# Patient Record
Sex: MALE | Race: ASIAN | NOT HISPANIC OR LATINO
[De-identification: names, ages, dates, MRNs, and addresses within clinical notes are randomized per-mention and may not be internally consistent; named-entity substitution may affect disease eponyms.]

---

## 2017-01-19 PROBLEM — Z00.00 ENCOUNTER FOR PREVENTIVE HEALTH EXAMINATION: Status: ACTIVE | Noted: 2017-01-19

## 2017-01-26 ENCOUNTER — APPOINTMENT (OUTPATIENT)
Dept: HEART AND VASCULAR | Facility: CLINIC | Age: 57
End: 2017-01-26

## 2017-01-26 VITALS
BODY MASS INDEX: 26.46 KG/M2 | DIASTOLIC BLOOD PRESSURE: 74 MMHG | HEIGHT: 71 IN | SYSTOLIC BLOOD PRESSURE: 110 MMHG | OXYGEN SATURATION: 97 % | HEART RATE: 67 BPM | WEIGHT: 189 LBS

## 2017-01-26 DIAGNOSIS — I25.10 ATHEROSCLEROTIC HEART DISEASE OF NATIVE CORONARY ARTERY W/OUT ANGINA PECTORIS: ICD-10-CM

## 2017-01-26 RX ORDER — LISINOPRIL 5 MG/1
5 TABLET ORAL
Refills: 0 | Status: ACTIVE | COMMUNITY

## 2017-01-26 RX ORDER — ISOSORBIDE MONONITRATE 30 MG
30 TABLET, EXTENDED RELEASE 24 HR ORAL
Refills: 0 | Status: ACTIVE | COMMUNITY

## 2017-01-26 RX ORDER — ATORVASTATIN CALCIUM 40 MG/1
40 TABLET, FILM COATED ORAL DAILY
Qty: 30 | Refills: 11 | Status: ACTIVE | COMMUNITY
Start: 2017-01-26 | End: 1900-01-01

## 2017-01-26 RX ORDER — PANTOPRAZOLE 40 MG/1
40 TABLET, DELAYED RELEASE ORAL
Refills: 0 | Status: ACTIVE | COMMUNITY

## 2017-01-26 RX ORDER — PRASUGREL HYDROCHLORIDE 10 MG/1
10 TABLET, COATED ORAL DAILY
Qty: 30 | Refills: 11 | Status: ACTIVE | COMMUNITY
Start: 2017-01-26 | End: 1900-01-01

## 2017-01-26 RX ORDER — METOPROLOL SUCCINATE 25 MG/1
25 TABLET, EXTENDED RELEASE ORAL
Refills: 0 | Status: ACTIVE | COMMUNITY

## 2017-05-04 ENCOUNTER — APPOINTMENT (OUTPATIENT)
Dept: HEART AND VASCULAR | Facility: CLINIC | Age: 57
End: 2017-05-04

## 2018-01-30 VITALS
TEMPERATURE: 99 F | DIASTOLIC BLOOD PRESSURE: 74 MMHG | RESPIRATION RATE: 18 BRPM | HEART RATE: 92 BPM | SYSTOLIC BLOOD PRESSURE: 113 MMHG | WEIGHT: 179.02 LBS | OXYGEN SATURATION: 96 %

## 2018-01-30 PROCEDURE — 99285 EMERGENCY DEPT VISIT HI MDM: CPT | Mod: 25

## 2018-01-30 RX ORDER — ATORVASTATIN CALCIUM 80 MG/1
1 TABLET, FILM COATED ORAL
Qty: 0 | Refills: 0 | COMMUNITY

## 2018-01-30 RX ORDER — PRASUGREL 5 MG/1
1 TABLET, FILM COATED ORAL
Qty: 0 | Refills: 0 | COMMUNITY

## 2018-01-30 NOTE — ED ADULT NURSE NOTE - PMH
Coronary artery disease    Diabetes    HLD (hyperlipidemia)    Hypertension    Urethral stricture  difficulty urinating

## 2018-01-30 NOTE — ED PROVIDER NOTE - GENITOURINARY, MLM
small small 2 cm area swelling, firm, painful to touch R perineum. no overlying erythema or warmth. No fluctuance

## 2018-01-30 NOTE — ED PROVIDER NOTE - OBJECTIVE STATEMENT
Pt w/ PMHx CAD s/p PCI, DM, HTN, HLD, urethral stricture s/p repair, p/w recurrent abscess to R perineum x 6 days. The abscess is painful, non draining. No f/c. No uncontrolled BS. Pt called Urologist Dr Steward who advised him to come to the ED for admission to Urology and OR I&D in the morning. Pt has no other complaints.

## 2018-01-30 NOTE — ED PROVIDER NOTE - PROGRESS NOTE DETAILS
Urology paged D/w Urology PA - admit to Dr Steward, regional bed. For OR in the am. Requesting Vivian / Zosyn.

## 2018-01-30 NOTE — ED PROVIDER NOTE - MEDICAL DECISION MAKING DETAILS
Pt sent in for admission to Urology for OR I&D for possible abscess. D/w Urology, requesting pre-op labs, EKG, abx, admit to Urology

## 2018-01-30 NOTE — ED ADULT NURSE NOTE - CHPI ED SYMPTOMS NEG
no purulent drainage/no chills/no bleeding/no bleeding at site/no red streaks/no vomiting/no fever/no drainage

## 2018-01-30 NOTE — ED ADULT NURSE NOTE - OBJECTIVE STATEMENT
Pt came to ED complaining of abscess in perineal area x5 days. Pt sent in by PCP for possible surgery tomorrow. Pt reports severe pain.  Pt has approx 1.5cm diameter abscess. No discharge or bleeding found at this time.  Pt denies all other medical complaints at this time. Alert and oriented x3.

## 2018-01-31 ENCOUNTER — INPATIENT (INPATIENT)
Facility: HOSPITAL | Age: 58
LOS: 1 days | Discharge: HOME | DRG: 697 | End: 2018-02-02
Attending: UROLOGY | Admitting: UROLOGY
Payer: COMMERCIAL

## 2018-01-31 DIAGNOSIS — L02.215 CUTANEOUS ABSCESS OF PERINEUM: ICD-10-CM

## 2018-01-31 DIAGNOSIS — Z41.9 ENCOUNTER FOR PROCEDURE FOR PURPOSES OTHER THAN REMEDYING HEALTH STATE, UNSPECIFIED: Chronic | ICD-10-CM

## 2018-01-31 DIAGNOSIS — L02.91 CUTANEOUS ABSCESS, UNSPECIFIED: ICD-10-CM

## 2018-01-31 LAB
ALBUMIN SERPL ELPH-MCNC: 3.4 G/DL — SIGNIFICANT CHANGE UP (ref 3.3–5)
ALP SERPL-CCNC: 97 U/L — SIGNIFICANT CHANGE UP (ref 40–120)
ALT FLD-CCNC: 10 U/L — SIGNIFICANT CHANGE UP (ref 10–45)
ANION GAP SERPL CALC-SCNC: 11 MMOL/L — SIGNIFICANT CHANGE UP (ref 5–17)
ANION GAP SERPL CALC-SCNC: 11 MMOL/L — SIGNIFICANT CHANGE UP (ref 5–17)
APTT BLD: 30.3 SEC — SIGNIFICANT CHANGE UP (ref 27.5–37.4)
AST SERPL-CCNC: 12 U/L — SIGNIFICANT CHANGE UP (ref 10–40)
BASOPHILS NFR BLD AUTO: 0.2 % — SIGNIFICANT CHANGE UP (ref 0–2)
BASOPHILS NFR BLD AUTO: 0.2 % — SIGNIFICANT CHANGE UP (ref 0–2)
BILIRUB SERPL-MCNC: 0.5 MG/DL — SIGNIFICANT CHANGE UP (ref 0.2–1.2)
BLD GP AB SCN SERPL QL: NEGATIVE — SIGNIFICANT CHANGE UP
BUN SERPL-MCNC: 12 MG/DL — SIGNIFICANT CHANGE UP (ref 7–23)
BUN SERPL-MCNC: 13 MG/DL — SIGNIFICANT CHANGE UP (ref 7–23)
CALCIUM SERPL-MCNC: 8.8 MG/DL — SIGNIFICANT CHANGE UP (ref 8.4–10.5)
CALCIUM SERPL-MCNC: 8.9 MG/DL — SIGNIFICANT CHANGE UP (ref 8.4–10.5)
CHLORIDE SERPL-SCNC: 93 MMOL/L — LOW (ref 96–108)
CHLORIDE SERPL-SCNC: 94 MMOL/L — LOW (ref 96–108)
CO2 SERPL-SCNC: 27 MMOL/L — SIGNIFICANT CHANGE UP (ref 22–31)
CO2 SERPL-SCNC: 27 MMOL/L — SIGNIFICANT CHANGE UP (ref 22–31)
CREAT SERPL-MCNC: 0.82 MG/DL — SIGNIFICANT CHANGE UP (ref 0.5–1.3)
CREAT SERPL-MCNC: 0.84 MG/DL — SIGNIFICANT CHANGE UP (ref 0.5–1.3)
EOSINOPHIL NFR BLD AUTO: 1.7 % — SIGNIFICANT CHANGE UP (ref 0–6)
EOSINOPHIL NFR BLD AUTO: 1.8 % — SIGNIFICANT CHANGE UP (ref 0–6)
GLUCOSE BLDC GLUCOMTR-MCNC: 173 MG/DL — HIGH (ref 70–99)
GLUCOSE BLDC GLUCOMTR-MCNC: 233 MG/DL — HIGH (ref 70–99)
GLUCOSE BLDC GLUCOMTR-MCNC: 236 MG/DL — HIGH (ref 70–99)
GLUCOSE BLDC GLUCOMTR-MCNC: 278 MG/DL — HIGH (ref 70–99)
GLUCOSE SERPL-MCNC: 279 MG/DL — HIGH (ref 70–99)
GLUCOSE SERPL-MCNC: 386 MG/DL — HIGH (ref 70–99)
GRAM STN FLD: SIGNIFICANT CHANGE UP
GRAM STN FLD: SIGNIFICANT CHANGE UP
HBA1C BLD-MCNC: 11.1 % — HIGH (ref 4–5.6)
HCT VFR BLD CALC: 38 % — LOW (ref 39–50)
HCT VFR BLD CALC: 38.8 % — LOW (ref 39–50)
HGB BLD-MCNC: 12.7 G/DL — LOW (ref 13–17)
HGB BLD-MCNC: 13 G/DL — SIGNIFICANT CHANGE UP (ref 13–17)
INR BLD: 1.31 — HIGH (ref 0.88–1.16)
LYMPHOCYTES # BLD AUTO: 17.9 % — SIGNIFICANT CHANGE UP (ref 13–44)
LYMPHOCYTES # BLD AUTO: 22 % — SIGNIFICANT CHANGE UP (ref 13–44)
MAGNESIUM SERPL-MCNC: 1.9 MG/DL — SIGNIFICANT CHANGE UP (ref 1.6–2.6)
MCHC RBC-ENTMCNC: 25.1 PG — LOW (ref 27–34)
MCHC RBC-ENTMCNC: 25.6 PG — LOW (ref 27–34)
MCHC RBC-ENTMCNC: 33.4 G/DL — SIGNIFICANT CHANGE UP (ref 32–36)
MCHC RBC-ENTMCNC: 33.5 G/DL — SIGNIFICANT CHANGE UP (ref 32–36)
MCV RBC AUTO: 75.2 FL — LOW (ref 80–100)
MCV RBC AUTO: 76.5 FL — LOW (ref 80–100)
MONOCYTES NFR BLD AUTO: 6.2 % — SIGNIFICANT CHANGE UP (ref 2–14)
MONOCYTES NFR BLD AUTO: 7.5 % — SIGNIFICANT CHANGE UP (ref 2–14)
NEUTROPHILS NFR BLD AUTO: 69.9 % — SIGNIFICANT CHANGE UP (ref 43–77)
NEUTROPHILS NFR BLD AUTO: 72.6 % — SIGNIFICANT CHANGE UP (ref 43–77)
PHOSPHATE SERPL-MCNC: 3 MG/DL — SIGNIFICANT CHANGE UP (ref 2.5–4.5)
PLATELET # BLD AUTO: 262 K/UL — SIGNIFICANT CHANGE UP (ref 150–400)
PLATELET # BLD AUTO: 291 K/UL — SIGNIFICANT CHANGE UP (ref 150–400)
POTASSIUM SERPL-MCNC: 4 MMOL/L — SIGNIFICANT CHANGE UP (ref 3.5–5.3)
POTASSIUM SERPL-MCNC: 4 MMOL/L — SIGNIFICANT CHANGE UP (ref 3.5–5.3)
POTASSIUM SERPL-SCNC: 4 MMOL/L — SIGNIFICANT CHANGE UP (ref 3.5–5.3)
POTASSIUM SERPL-SCNC: 4 MMOL/L — SIGNIFICANT CHANGE UP (ref 3.5–5.3)
PROT SERPL-MCNC: 8.2 G/DL — SIGNIFICANT CHANGE UP (ref 6–8.3)
PROTHROM AB SERPL-ACNC: 14.6 SEC — HIGH (ref 9.8–12.7)
RBC # BLD: 5.05 M/UL — SIGNIFICANT CHANGE UP (ref 4.2–5.8)
RBC # BLD: 5.07 M/UL — SIGNIFICANT CHANGE UP (ref 4.2–5.8)
RBC # FLD: 12.9 % — SIGNIFICANT CHANGE UP (ref 10.3–16.9)
RBC # FLD: 12.9 % — SIGNIFICANT CHANGE UP (ref 10.3–16.9)
RH IG SCN BLD-IMP: NEGATIVE — SIGNIFICANT CHANGE UP
SODIUM SERPL-SCNC: 131 MMOL/L — LOW (ref 135–145)
SODIUM SERPL-SCNC: 132 MMOL/L — LOW (ref 135–145)
SPECIMEN SOURCE: SIGNIFICANT CHANGE UP
SPECIMEN SOURCE: SIGNIFICANT CHANGE UP
WBC # BLD: 10.4 K/UL — SIGNIFICANT CHANGE UP (ref 3.8–10.5)
WBC # BLD: 12.5 K/UL — HIGH (ref 3.8–10.5)
WBC # FLD AUTO: 10.4 K/UL — SIGNIFICANT CHANGE UP (ref 3.8–10.5)
WBC # FLD AUTO: 12.5 K/UL — HIGH (ref 3.8–10.5)

## 2018-01-31 PROCEDURE — 71045 X-RAY EXAM CHEST 1 VIEW: CPT | Mod: 26

## 2018-01-31 PROCEDURE — 99232 SBSQ HOSP IP/OBS MODERATE 35: CPT

## 2018-01-31 PROCEDURE — 93306 TTE W/DOPPLER COMPLETE: CPT | Mod: 26

## 2018-01-31 RX ORDER — SODIUM CHLORIDE 9 MG/ML
1000 INJECTION, SOLUTION INTRAVENOUS
Qty: 0 | Refills: 0 | Status: DISCONTINUED | OUTPATIENT
Start: 2018-01-31 | End: 2018-02-02

## 2018-01-31 RX ORDER — DEXTROSE 50 % IN WATER 50 %
12.5 SYRINGE (ML) INTRAVENOUS ONCE
Qty: 0 | Refills: 0 | Status: DISCONTINUED | OUTPATIENT
Start: 2018-01-31 | End: 2018-01-31

## 2018-01-31 RX ORDER — SODIUM CHLORIDE 9 MG/ML
1000 INJECTION INTRAMUSCULAR; INTRAVENOUS; SUBCUTANEOUS
Qty: 0 | Refills: 0 | Status: DISCONTINUED | OUTPATIENT
Start: 2018-01-31 | End: 2018-01-31

## 2018-01-31 RX ORDER — SODIUM CHLORIDE 9 MG/ML
1000 INJECTION, SOLUTION INTRAVENOUS
Qty: 0 | Refills: 0 | Status: DISCONTINUED | OUTPATIENT
Start: 2018-01-31 | End: 2018-01-31

## 2018-01-31 RX ORDER — METOPROLOL TARTRATE 50 MG
25 TABLET ORAL DAILY
Qty: 0 | Refills: 0 | Status: DISCONTINUED | OUTPATIENT
Start: 2018-01-31 | End: 2018-02-02

## 2018-01-31 RX ORDER — DOCUSATE SODIUM 100 MG
100 CAPSULE ORAL
Qty: 0 | Refills: 0 | Status: DISCONTINUED | OUTPATIENT
Start: 2018-01-31 | End: 2018-02-02

## 2018-01-31 RX ORDER — ONDANSETRON 8 MG/1
4 TABLET, FILM COATED ORAL EVERY 6 HOURS
Qty: 0 | Refills: 0 | Status: DISCONTINUED | OUTPATIENT
Start: 2018-01-31 | End: 2018-01-31

## 2018-01-31 RX ORDER — DEXTROSE 50 % IN WATER 50 %
1 SYRINGE (ML) INTRAVENOUS ONCE
Qty: 0 | Refills: 0 | Status: DISCONTINUED | OUTPATIENT
Start: 2018-01-31 | End: 2018-02-01

## 2018-01-31 RX ORDER — SENNA PLUS 8.6 MG/1
2 TABLET ORAL AT BEDTIME
Qty: 0 | Refills: 0 | Status: DISCONTINUED | OUTPATIENT
Start: 2018-01-31 | End: 2018-02-02

## 2018-01-31 RX ORDER — VANCOMYCIN HCL 1 G
1000 VIAL (EA) INTRAVENOUS EVERY 12 HOURS
Qty: 0 | Refills: 0 | Status: DISCONTINUED | OUTPATIENT
Start: 2018-01-31 | End: 2018-01-31

## 2018-01-31 RX ORDER — PIPERACILLIN AND TAZOBACTAM 4; .5 G/20ML; G/20ML
3.38 INJECTION, POWDER, LYOPHILIZED, FOR SOLUTION INTRAVENOUS EVERY 6 HOURS
Qty: 0 | Refills: 0 | Status: DISCONTINUED | OUTPATIENT
Start: 2018-01-31 | End: 2018-01-31

## 2018-01-31 RX ORDER — ATORVASTATIN CALCIUM 80 MG/1
80 TABLET, FILM COATED ORAL AT BEDTIME
Qty: 0 | Refills: 0 | Status: DISCONTINUED | OUTPATIENT
Start: 2018-01-31 | End: 2018-02-02

## 2018-01-31 RX ORDER — OXYCODONE HYDROCHLORIDE 5 MG/1
5 TABLET ORAL EVERY 4 HOURS
Qty: 0 | Refills: 0 | Status: DISCONTINUED | OUTPATIENT
Start: 2018-01-31 | End: 2018-02-02

## 2018-01-31 RX ORDER — DEXTROSE 50 % IN WATER 50 %
25 SYRINGE (ML) INTRAVENOUS ONCE
Qty: 0 | Refills: 0 | Status: DISCONTINUED | OUTPATIENT
Start: 2018-01-31 | End: 2018-02-01

## 2018-01-31 RX ORDER — HYDROCHLOROTHIAZIDE 25 MG
25 TABLET ORAL DAILY
Qty: 0 | Refills: 0 | Status: DISCONTINUED | OUTPATIENT
Start: 2018-01-31 | End: 2018-01-31

## 2018-01-31 RX ORDER — GLUCAGON INJECTION, SOLUTION 0.5 MG/.1ML
1 INJECTION, SOLUTION SUBCUTANEOUS ONCE
Qty: 0 | Refills: 0 | Status: DISCONTINUED | OUTPATIENT
Start: 2018-01-31 | End: 2018-02-01

## 2018-01-31 RX ORDER — ISOSORBIDE MONONITRATE 60 MG/1
30 TABLET, EXTENDED RELEASE ORAL DAILY
Qty: 0 | Refills: 0 | Status: DISCONTINUED | OUTPATIENT
Start: 2018-01-31 | End: 2018-02-02

## 2018-01-31 RX ORDER — ATORVASTATIN CALCIUM 80 MG/1
80 TABLET, FILM COATED ORAL AT BEDTIME
Qty: 0 | Refills: 0 | Status: DISCONTINUED | OUTPATIENT
Start: 2018-02-03 | End: 2018-01-31

## 2018-01-31 RX ORDER — INSULIN LISPRO 100/ML
VIAL (ML) SUBCUTANEOUS
Qty: 0 | Refills: 0 | Status: DISCONTINUED | OUTPATIENT
Start: 2018-01-31 | End: 2018-01-31

## 2018-01-31 RX ORDER — IBUPROFEN 200 MG
400 TABLET ORAL EVERY 6 HOURS
Qty: 0 | Refills: 0 | Status: DISCONTINUED | OUTPATIENT
Start: 2018-01-31 | End: 2018-02-02

## 2018-01-31 RX ORDER — DEXTROSE 50 % IN WATER 50 %
12.5 SYRINGE (ML) INTRAVENOUS ONCE
Qty: 0 | Refills: 0 | Status: DISCONTINUED | OUTPATIENT
Start: 2018-01-31 | End: 2018-02-01

## 2018-01-31 RX ORDER — SODIUM CHLORIDE 9 MG/ML
1000 INJECTION INTRAMUSCULAR; INTRAVENOUS; SUBCUTANEOUS
Qty: 0 | Refills: 0 | Status: DISCONTINUED | OUTPATIENT
Start: 2018-01-31 | End: 2018-02-02

## 2018-01-31 RX ORDER — DOCUSATE SODIUM 100 MG
100 CAPSULE ORAL
Qty: 0 | Refills: 0 | Status: DISCONTINUED | OUTPATIENT
Start: 2018-01-31 | End: 2018-01-31

## 2018-01-31 RX ORDER — GLUCAGON INJECTION, SOLUTION 0.5 MG/.1ML
1 INJECTION, SOLUTION SUBCUTANEOUS ONCE
Qty: 0 | Refills: 0 | Status: DISCONTINUED | OUTPATIENT
Start: 2018-01-31 | End: 2018-01-31

## 2018-01-31 RX ORDER — PIPERACILLIN AND TAZOBACTAM 4; .5 G/20ML; G/20ML
3.38 INJECTION, POWDER, LYOPHILIZED, FOR SOLUTION INTRAVENOUS ONCE
Qty: 0 | Refills: 0 | Status: COMPLETED | OUTPATIENT
Start: 2018-01-31 | End: 2018-01-31

## 2018-01-31 RX ORDER — DEXTROSE 50 % IN WATER 50 %
25 SYRINGE (ML) INTRAVENOUS ONCE
Qty: 0 | Refills: 0 | Status: DISCONTINUED | OUTPATIENT
Start: 2018-01-31 | End: 2018-01-31

## 2018-01-31 RX ORDER — LISINOPRIL 2.5 MG/1
5 TABLET ORAL DAILY
Qty: 0 | Refills: 0 | Status: DISCONTINUED | OUTPATIENT
Start: 2018-01-31 | End: 2018-01-31

## 2018-01-31 RX ORDER — DEXTROSE 50 % IN WATER 50 %
1 SYRINGE (ML) INTRAVENOUS ONCE
Qty: 0 | Refills: 0 | Status: DISCONTINUED | OUTPATIENT
Start: 2018-01-31 | End: 2018-01-31

## 2018-01-31 RX ORDER — ATORVASTATIN CALCIUM 80 MG/1
40 TABLET, FILM COATED ORAL DAILY
Qty: 0 | Refills: 0 | Status: DISCONTINUED | OUTPATIENT
Start: 2018-01-31 | End: 2018-01-31

## 2018-01-31 RX ORDER — INSULIN LISPRO 100/ML
VIAL (ML) SUBCUTANEOUS
Qty: 0 | Refills: 0 | Status: DISCONTINUED | OUTPATIENT
Start: 2018-01-31 | End: 2018-02-02

## 2018-01-31 RX ORDER — ASPIRIN/CALCIUM CARB/MAGNESIUM 324 MG
81 TABLET ORAL DAILY
Qty: 0 | Refills: 0 | Status: DISCONTINUED | OUTPATIENT
Start: 2018-01-31 | End: 2018-02-02

## 2018-01-31 RX ORDER — ISOSORBIDE MONONITRATE 60 MG/1
30 TABLET, EXTENDED RELEASE ORAL DAILY
Qty: 0 | Refills: 0 | Status: DISCONTINUED | OUTPATIENT
Start: 2018-01-31 | End: 2018-01-31

## 2018-01-31 RX ORDER — PIPERACILLIN AND TAZOBACTAM 4; .5 G/20ML; G/20ML
3.38 INJECTION, POWDER, LYOPHILIZED, FOR SOLUTION INTRAVENOUS EVERY 6 HOURS
Qty: 0 | Refills: 0 | Status: DISCONTINUED | OUTPATIENT
Start: 2018-01-31 | End: 2018-02-02

## 2018-01-31 RX ORDER — VANCOMYCIN HCL 1 G
1000 VIAL (EA) INTRAVENOUS ONCE
Qty: 0 | Refills: 0 | Status: COMPLETED | OUTPATIENT
Start: 2018-01-31 | End: 2018-01-31

## 2018-01-31 RX ORDER — OXYCODONE HYDROCHLORIDE 5 MG/1
5 TABLET ORAL ONCE
Qty: 0 | Refills: 0 | Status: DISCONTINUED | OUTPATIENT
Start: 2018-01-31 | End: 2018-01-31

## 2018-01-31 RX ORDER — MORPHINE SULFATE 50 MG/1
4 CAPSULE, EXTENDED RELEASE ORAL EVERY 6 HOURS
Qty: 0 | Refills: 0 | Status: DISCONTINUED | OUTPATIENT
Start: 2018-01-31 | End: 2018-01-31

## 2018-01-31 RX ORDER — ONDANSETRON 8 MG/1
4 TABLET, FILM COATED ORAL EVERY 8 HOURS
Qty: 0 | Refills: 0 | Status: DISCONTINUED | OUTPATIENT
Start: 2018-01-31 | End: 2018-02-02

## 2018-01-31 RX ORDER — ASPIRIN/CALCIUM CARB/MAGNESIUM 324 MG
81 TABLET ORAL DAILY
Qty: 0 | Refills: 0 | Status: DISCONTINUED | OUTPATIENT
Start: 2018-01-31 | End: 2018-01-31

## 2018-01-31 RX ORDER — SENNA PLUS 8.6 MG/1
2 TABLET ORAL AT BEDTIME
Qty: 0 | Refills: 0 | Status: DISCONTINUED | OUTPATIENT
Start: 2018-01-31 | End: 2018-01-31

## 2018-01-31 RX ORDER — HYDROMORPHONE HYDROCHLORIDE 2 MG/ML
0.5 INJECTION INTRAMUSCULAR; INTRAVENOUS; SUBCUTANEOUS EVERY 4 HOURS
Qty: 0 | Refills: 0 | Status: DISCONTINUED | OUTPATIENT
Start: 2018-01-31 | End: 2018-02-02

## 2018-01-31 RX ORDER — VANCOMYCIN HCL 1 G
1250 VIAL (EA) INTRAVENOUS EVERY 12 HOURS
Qty: 0 | Refills: 0 | Status: DISCONTINUED | OUTPATIENT
Start: 2018-01-31 | End: 2018-02-02

## 2018-01-31 RX ADMIN — LISINOPRIL 5 MILLIGRAM(S): 2.5 TABLET ORAL at 06:02

## 2018-01-31 RX ADMIN — SODIUM CHLORIDE 100 MILLILITER(S): 9 INJECTION INTRAMUSCULAR; INTRAVENOUS; SUBCUTANEOUS at 08:30

## 2018-01-31 RX ADMIN — PIPERACILLIN AND TAZOBACTAM 200 GRAM(S): 4; .5 INJECTION, POWDER, LYOPHILIZED, FOR SOLUTION INTRAVENOUS at 15:01

## 2018-01-31 RX ADMIN — ISOSORBIDE MONONITRATE 30 MILLIGRAM(S): 60 TABLET, EXTENDED RELEASE ORAL at 12:06

## 2018-01-31 RX ADMIN — ATORVASTATIN CALCIUM 40 MILLIGRAM(S): 80 TABLET, FILM COATED ORAL at 12:06

## 2018-01-31 RX ADMIN — Medication 81 MILLIGRAM(S): at 15:01

## 2018-01-31 RX ADMIN — MORPHINE SULFATE 4 MILLIGRAM(S): 50 CAPSULE, EXTENDED RELEASE ORAL at 08:45

## 2018-01-31 RX ADMIN — OXYCODONE HYDROCHLORIDE 5 MILLIGRAM(S): 5 TABLET ORAL at 03:41

## 2018-01-31 RX ADMIN — Medication 250 MILLIGRAM(S): at 00:20

## 2018-01-31 RX ADMIN — Medication 250 MILLIGRAM(S): at 12:06

## 2018-01-31 RX ADMIN — MORPHINE SULFATE 4 MILLIGRAM(S): 50 CAPSULE, EXTENDED RELEASE ORAL at 08:30

## 2018-01-31 RX ADMIN — Medication 25 MILLIGRAM(S): at 06:02

## 2018-01-31 RX ADMIN — Medication 100 MILLIGRAM(S): at 06:02

## 2018-01-31 RX ADMIN — PIPERACILLIN AND TAZOBACTAM 200 GRAM(S): 4; .5 INJECTION, POWDER, LYOPHILIZED, FOR SOLUTION INTRAVENOUS at 02:33

## 2018-01-31 RX ADMIN — OXYCODONE HYDROCHLORIDE 5 MILLIGRAM(S): 5 TABLET ORAL at 02:33

## 2018-01-31 RX ADMIN — PIPERACILLIN AND TAZOBACTAM 200 GRAM(S): 4; .5 INJECTION, POWDER, LYOPHILIZED, FOR SOLUTION INTRAVENOUS at 09:02

## 2018-01-31 RX ADMIN — Medication 4: at 17:12

## 2018-01-31 RX ADMIN — Medication 100 MILLIGRAM(S): at 17:12

## 2018-01-31 NOTE — BRIEF OPERATIVE NOTE - PROCEDURE
<<-----Click on this checkbox to enter Procedure Incision and drainage of abscess  01/31/2018    Active  YOSEPH  Urethral dilation  01/31/2018    Active  PATRICIAONA

## 2018-01-31 NOTE — BRIEF OPERATIVE NOTE - OPERATION/FINDINGS
RUG with normal study no fistula or extrav. uretheral stricture, dilation with amplatz dilators, lewis placement, perineal incision with minimal drainage,.  packed with 1/2 inch iodoform packin

## 2018-01-31 NOTE — H&P ADULT - HISTORY OF PRESENT ILLNESS
57M PMH CAD s/p PCI, DM, HTN, HLD, urethral stricture s/p rx, perineal abscess s/p I&D (2017) p/w recurrence of perineal abscess c/o painful perineum with some d/c x 6d. He denies shaving area or known trauma, f/c, n/v/d/c, hematuria, dysuria or LUTS.

## 2018-01-31 NOTE — H&P ADULT - NSHPPHYSICALEXAM_GEN_ALL_CORE
Gen: NAD, afebrile, resting comfortably on stretcher  Chest: s1s2, no murmurs appreciated, lungs CTA B/L  Abd: soft, NTND, no guarding  : +induration with mild fluctuance to perineum, ttp, no d/c noted when pressure applied, nml external male genitalia, +BRP, voiding clear, yellow urine

## 2018-01-31 NOTE — PRE-OP CHECKLIST - RESPIRATORY RATE (BREATHS/MIN)
Pt asystole on monitor. Notified cardiology resident on-call. Dr. Bass at bedside. Pt's time of death 0842.  Spouse at bedside.    16

## 2018-01-31 NOTE — H&P ADULT - ASSESSMENT
57M with recurrence of perineal abscess s/p I&D one year ago admitted for OR today to I&D and washout   -NPO, IVF  -f/u preop labs, CXR, EKG  -consent for OR  -added on for I&D  -dvt ppx  -pain meds PRN  -OOB/IS  -ISS  -hold anticoagulants and DM meds  -bowel regimen  -consult Fabien for medical clearance in AM

## 2018-02-01 DIAGNOSIS — E11.8 TYPE 2 DIABETES MELLITUS WITH UNSPECIFIED COMPLICATIONS: ICD-10-CM

## 2018-02-01 LAB
ANION GAP SERPL CALC-SCNC: 11 MMOL/L — SIGNIFICANT CHANGE UP (ref 5–17)
BUN SERPL-MCNC: 13 MG/DL — SIGNIFICANT CHANGE UP (ref 7–23)
CALCIUM SERPL-MCNC: 8.4 MG/DL — SIGNIFICANT CHANGE UP (ref 8.4–10.5)
CHLORIDE SERPL-SCNC: 97 MMOL/L — SIGNIFICANT CHANGE UP (ref 96–108)
CO2 SERPL-SCNC: 25 MMOL/L — SIGNIFICANT CHANGE UP (ref 22–31)
CREAT SERPL-MCNC: 1.26 MG/DL — SIGNIFICANT CHANGE UP (ref 0.5–1.3)
GLUCOSE BLDC GLUCOMTR-MCNC: 166 MG/DL — HIGH (ref 70–99)
GLUCOSE BLDC GLUCOMTR-MCNC: 221 MG/DL — HIGH (ref 70–99)
GLUCOSE BLDC GLUCOMTR-MCNC: 227 MG/DL — HIGH (ref 70–99)
GLUCOSE BLDC GLUCOMTR-MCNC: 265 MG/DL — HIGH (ref 70–99)
GLUCOSE BLDC GLUCOMTR-MCNC: 276 MG/DL — HIGH (ref 70–99)
GLUCOSE SERPL-MCNC: 250 MG/DL — HIGH (ref 70–99)
HCT VFR BLD CALC: 34.3 % — LOW (ref 39–50)
HGB BLD-MCNC: 11.6 G/DL — LOW (ref 13–17)
MAGNESIUM SERPL-MCNC: 1.7 MG/DL — SIGNIFICANT CHANGE UP (ref 1.6–2.6)
MCHC RBC-ENTMCNC: 25.7 PG — LOW (ref 27–34)
MCHC RBC-ENTMCNC: 33.8 G/DL — SIGNIFICANT CHANGE UP (ref 32–36)
MCV RBC AUTO: 76.1 FL — LOW (ref 80–100)
PHOSPHATE SERPL-MCNC: 2.6 MG/DL — SIGNIFICANT CHANGE UP (ref 2.5–4.5)
PLATELET # BLD AUTO: 227 K/UL — SIGNIFICANT CHANGE UP (ref 150–400)
POTASSIUM SERPL-MCNC: 4.1 MMOL/L — SIGNIFICANT CHANGE UP (ref 3.5–5.3)
POTASSIUM SERPL-SCNC: 4.1 MMOL/L — SIGNIFICANT CHANGE UP (ref 3.5–5.3)
RBC # BLD: 4.51 M/UL — SIGNIFICANT CHANGE UP (ref 4.2–5.8)
RBC # FLD: 12.8 % — SIGNIFICANT CHANGE UP (ref 10.3–16.9)
SODIUM SERPL-SCNC: 133 MMOL/L — LOW (ref 135–145)
WBC # BLD: 9.8 K/UL — SIGNIFICANT CHANGE UP (ref 3.8–10.5)
WBC # FLD AUTO: 9.8 K/UL — SIGNIFICANT CHANGE UP (ref 3.8–10.5)

## 2018-02-01 PROCEDURE — 99232 SBSQ HOSP IP/OBS MODERATE 35: CPT

## 2018-02-01 PROCEDURE — 76870 US EXAM SCROTUM: CPT | Mod: 26

## 2018-02-01 PROCEDURE — 71045 X-RAY EXAM CHEST 1 VIEW: CPT | Mod: 26

## 2018-02-01 RX ORDER — INSULIN GLARGINE 100 [IU]/ML
33 INJECTION, SOLUTION SUBCUTANEOUS AT BEDTIME
Qty: 0 | Refills: 0 | Status: DISCONTINUED | OUTPATIENT
Start: 2018-02-01 | End: 2018-02-01

## 2018-02-01 RX ORDER — MAGNESIUM OXIDE 400 MG ORAL TABLET 241.3 MG
400 TABLET ORAL ONCE
Qty: 0 | Refills: 0 | Status: COMPLETED | OUTPATIENT
Start: 2018-02-01 | End: 2018-02-01

## 2018-02-01 RX ORDER — INSULIN LISPRO 100/ML
6 VIAL (ML) SUBCUTANEOUS
Qty: 0 | Refills: 0 | Status: DISCONTINUED | OUTPATIENT
Start: 2018-02-01 | End: 2018-02-01

## 2018-02-01 RX ORDER — INSULIN LISPRO 100/ML
6 VIAL (ML) SUBCUTANEOUS
Qty: 0 | Refills: 0 | Status: DISCONTINUED | OUTPATIENT
Start: 2018-02-01 | End: 2018-02-02

## 2018-02-01 RX ORDER — INSULIN LISPRO 100/ML
VIAL (ML) SUBCUTANEOUS
Qty: 0 | Refills: 0 | Status: DISCONTINUED | OUTPATIENT
Start: 2018-02-01 | End: 2018-02-01

## 2018-02-01 RX ORDER — INSULIN GLARGINE 100 [IU]/ML
20 INJECTION, SOLUTION SUBCUTANEOUS AT BEDTIME
Qty: 0 | Refills: 0 | Status: DISCONTINUED | OUTPATIENT
Start: 2018-02-01 | End: 2018-02-02

## 2018-02-01 RX ADMIN — SENNA PLUS 2 TABLET(S): 8.6 TABLET ORAL at 21:41

## 2018-02-01 RX ADMIN — Medication 100 MILLIGRAM(S): at 05:34

## 2018-02-01 RX ADMIN — Medication 400 MILLIGRAM(S): at 18:25

## 2018-02-01 RX ADMIN — PIPERACILLIN AND TAZOBACTAM 200 GRAM(S): 4; .5 INJECTION, POWDER, LYOPHILIZED, FOR SOLUTION INTRAVENOUS at 11:28

## 2018-02-01 RX ADMIN — PIPERACILLIN AND TAZOBACTAM 200 GRAM(S): 4; .5 INJECTION, POWDER, LYOPHILIZED, FOR SOLUTION INTRAVENOUS at 18:00

## 2018-02-01 RX ADMIN — Medication 6: at 12:16

## 2018-02-01 RX ADMIN — PIPERACILLIN AND TAZOBACTAM 200 GRAM(S): 4; .5 INJECTION, POWDER, LYOPHILIZED, FOR SOLUTION INTRAVENOUS at 05:35

## 2018-02-01 RX ADMIN — Medication 81 MILLIGRAM(S): at 11:28

## 2018-02-01 RX ADMIN — Medication 166.67 MILLIGRAM(S): at 19:20

## 2018-02-01 RX ADMIN — Medication 400 MILLIGRAM(S): at 08:00

## 2018-02-01 RX ADMIN — Medication 4: at 08:07

## 2018-02-01 RX ADMIN — OXYCODONE HYDROCHLORIDE 5 MILLIGRAM(S): 5 TABLET ORAL at 02:43

## 2018-02-01 RX ADMIN — MAGNESIUM OXIDE 400 MG ORAL TABLET 400 MILLIGRAM(S): 241.3 TABLET ORAL at 11:28

## 2018-02-01 RX ADMIN — Medication 400 MILLIGRAM(S): at 18:55

## 2018-02-01 RX ADMIN — Medication 6: at 21:45

## 2018-02-01 RX ADMIN — Medication 6 UNIT(S): at 18:00

## 2018-02-01 RX ADMIN — PIPERACILLIN AND TAZOBACTAM 200 GRAM(S): 4; .5 INJECTION, POWDER, LYOPHILIZED, FOR SOLUTION INTRAVENOUS at 01:26

## 2018-02-01 RX ADMIN — Medication 6 UNIT(S): at 12:16

## 2018-02-01 RX ADMIN — Medication 25 MILLIGRAM(S): at 05:34

## 2018-02-01 RX ADMIN — INSULIN GLARGINE 20 UNIT(S): 100 INJECTION, SOLUTION SUBCUTANEOUS at 21:41

## 2018-02-01 RX ADMIN — Medication 166.67 MILLIGRAM(S): at 06:11

## 2018-02-01 RX ADMIN — ISOSORBIDE MONONITRATE 30 MILLIGRAM(S): 60 TABLET, EXTENDED RELEASE ORAL at 11:28

## 2018-02-01 RX ADMIN — Medication 2: at 18:00

## 2018-02-01 RX ADMIN — ATORVASTATIN CALCIUM 80 MILLIGRAM(S): 80 TABLET, FILM COATED ORAL at 21:41

## 2018-02-01 RX ADMIN — Medication 400 MILLIGRAM(S): at 07:00

## 2018-02-01 RX ADMIN — Medication 100 MILLIGRAM(S): at 18:00

## 2018-02-01 RX ADMIN — OXYCODONE HYDROCHLORIDE 5 MILLIGRAM(S): 5 TABLET ORAL at 03:43

## 2018-02-01 NOTE — PROGRESS NOTE ADULT - PROBLEM SELECTOR PLAN 1
-stable  -OOB  -f/u labs  -continue local wound care  -continue scrotal support  -f/u cultures  -d/c planning
-stable  -OOB  -IS, SCD's  -Diet: vegetarian, diabetic  -Antibx: zosyn, vanco  -I's & O's  -pain control  -IVF's  -continue lewis  -continue scrotal support

## 2018-02-01 NOTE — PROGRESS NOTE ADULT - ASSESSMENT
58 yo male s/p I&D perineal abscess, RUG, urethral dilatation, POD #1
58 yo male s/p I&D perineum abscess, RUG, urethral dilatation

## 2018-02-01 NOTE — CONSULT NOTE ADULT - SUBJECTIVE AND OBJECTIVE BOX
57 M with PMH of CAD (s/p stents), HTN, DM2, HLD, urethral stricture presenting with recurrence of perineal abscess. Patient reports hx of DM2 for 5-6 years- initially on metformin but did not tolerate and switched to Insulin. Reports taking 38U of lantus, with no premeal, though intermittently uses correction. Reports checking FS daily- sometimes BID- in AM usually 150-170s, in -250s. Reports some increasing blurry vision over last few months,  went to opthamologist last month, Does not follow with podiatry. Reports hx of CAD with stents. Reports symptoms of polyuria/polydipsia. No parasthesias or numbness. Denies nausea, vomiting with meals. States that diet heavy with rice at home.     No complaints during exam. Reports NPO yesterday, yogurt, eggs and potatoes for breakfast this AM.       Blood Glucose.: 236 mg/dL (31 Jan 2018 17:08) 4U correction, no premeal  Blood Glucose.: 173 mg/dL (31 Jan 2018 22:28) No lantus given  Blood Glucose.: 227 mg/dL (01 Feb 2018 01:22)   Blood Glucose.: 221 mg/dL (01 Feb 2018 07:21) 4U correction, no premeal  Blood Glucose.: 276 mg/dL (01 Feb 2018 11:35) 4U correction, 6U premeal    OBJECTIVE  Vitals:  T(C): 36.8 (02-01-18 @ 12:13), Max: 38.5 (02-01-18 @ 07:02)  HR: 80 (02-01-18 @ 12:13) (68 - 95)  BP: 114/71 (02-01-18 @ 12:13) (98/61 - 122/68)  RR: 16 (02-01-18 @ 12:13) (9 - 20)  SpO2: 98% (02-01-18 @ 12:13) (95% - 99%)  Wt(kg): --    I/O:  I&O's Summary    31 Jan 2018 07:01  -  01 Feb 2018 07:00  --------------------------------------------------------  IN: 1650 mL / OUT: 875 mL / NET: 775 mL    01 Feb 2018 07:01  -  01 Feb 2018 13:40  --------------------------------------------------------  IN: 300 mL / OUT: 0 mL / NET: 300 mL        PHYSICAL EXAM:  Appearance: NAD. Speaking in full sentences.   HEENT:   Conjunctiva clear b/l. Moist oral mucosa.  Cardiovascular: RRR, S1, S2 with 3/6 systolic murmur along apex and LLSB.   Respiratory: Lungs CTAB.   Gastrointestinal:  Soft, nontender. Non-distended. Non-rigid.	  Extremities: No edema b/l. No erythema b/l. LE WWP b/l.   Vascular: DP 2+ b/l.  Neurologic:  Alert and awake. Moving all extremities. Sensation intact bilaterally, proprioception intact.  Following commands. Making eye contact.  	  LABS:                        11.6   9.8   )-----------( 227      ( 01 Feb 2018 07:18 )             34.3     02-01    133<L>  |  97  |  13  ----------------------------<  250<H>  4.1   |  25  |  1.26    Ca    8.4      01 Feb 2018 07:18  Phos  2.6     02-01  Mg     1.7     02-01    TPro  8.2  /  Alb  3.4  /  TBili  0.5  /  DBili  x   /  AST  12  /  ALT  10  /  AlkPhos  97  01-31    PT/INR - ( 31 Jan 2018 00:17 )   PT: 14.6 sec;   INR: 1.31          PTT - ( 31 Jan 2018 00:17 )  PTT:30.3 sec      RADIOLOGY & ADDITIONAL TESTS:  Reviewed .    MEDICATIONS  (STANDING):  aspirin enteric coated 81 milliGRAM(s) Oral daily  atorvastatin 80 milliGRAM(s) Oral at bedtime  dextrose 5%. 1000 milliLiter(s) (50 mL/Hr) IV Continuous <Continuous>  docusate sodium 100 milliGRAM(s) Oral two times a day  insulin lispro (HumaLOG) corrective regimen sliding scale   SubCutaneous Before meals and at bedtime  insulin lispro Injectable (HumaLOG) 6 Unit(s) SubCutaneous three times a day before meals  isosorbide   mononitrate ER Tablet (IMDUR) 30 milliGRAM(s) Oral daily  metoprolol succinate ER 25 milliGRAM(s) Oral daily  piperacillin/tazobactam IVPB. 3.375 Gram(s) IV Intermittent every 6 hours  senna 2 Tablet(s) Oral at bedtime  sodium chloride 0.9%. 1000 milliLiter(s) (80 mL/Hr) IV Continuous <Continuous>  vancomycin  IVPB 1250 milliGRAM(s) IV Intermittent every 12 hours    MEDICATIONS  (PRN):  HYDROmorphone  Injectable 0.5 milliGRAM(s) IV Push every 4 hours PRN Severe Pain (7 - 10)  ibuprofen  Tablet 400 milliGRAM(s) Oral every 6 hours PRN temperature >100.4F  ondansetron Injectable 4 milliGRAM(s) IV Push every 8 hours PRN Nausea and/or Vomiting  oxyCODONE    IR 5 milliGRAM(s) Oral every 4 hours PRN Moderate Pain (4 - 6)      ASSESSMENT:    PLAN:

## 2018-02-01 NOTE — CONSULT NOTE ADULT - ASSESSMENT
57M with PMH of CAD s/p stents, HTN, HLD, ureteral strictures with complication of perineal abscess presented for recurrence of perineal abscess. Endocrinology consulted for Diabetes management.

## 2018-02-01 NOTE — CONSULT NOTE ADULT - PROBLEM SELECTOR RECOMMENDATION 9
A1c at 11.1 on presentation. Reports basal insulin 38U. Started on Mod ISS on admission but NPO yesterday for procedure.   -Recommend Lantus 20U and c/w premeal 6U.     Pending discussion with attending on rounds this PM. A1c at 11.1 on presentation. Reports basal insulin 38U. Started on Mod ISS on admission but NPO yesterday for procedure.   -Recommend Lantus 20U and c/w premeal 6U.     Discussion on rounds this PM.

## 2018-02-02 ENCOUNTER — TRANSCRIPTION ENCOUNTER (OUTPATIENT)
Age: 58
End: 2018-02-02

## 2018-02-02 VITALS
TEMPERATURE: 99 F | DIASTOLIC BLOOD PRESSURE: 69 MMHG | OXYGEN SATURATION: 96 % | HEART RATE: 84 BPM | SYSTOLIC BLOOD PRESSURE: 123 MMHG | RESPIRATION RATE: 17 BRPM

## 2018-02-02 LAB
ANION GAP SERPL CALC-SCNC: 11 MMOL/L — SIGNIFICANT CHANGE UP (ref 5–17)
BASOPHILS NFR BLD AUTO: 0.4 % — SIGNIFICANT CHANGE UP (ref 0–2)
BUN SERPL-MCNC: 14 MG/DL — SIGNIFICANT CHANGE UP (ref 7–23)
CALCIUM SERPL-MCNC: 8.3 MG/DL — LOW (ref 8.4–10.5)
CHLORIDE SERPL-SCNC: 97 MMOL/L — SIGNIFICANT CHANGE UP (ref 96–108)
CO2 SERPL-SCNC: 26 MMOL/L — SIGNIFICANT CHANGE UP (ref 22–31)
CREAT SERPL-MCNC: 1.11 MG/DL — SIGNIFICANT CHANGE UP (ref 0.5–1.3)
CULTURE RESULTS: NO GROWTH — SIGNIFICANT CHANGE UP
CULTURE RESULTS: NO GROWTH — SIGNIFICANT CHANGE UP
EOSINOPHIL NFR BLD AUTO: 3.1 % — SIGNIFICANT CHANGE UP (ref 0–6)
GLUCOSE BLDC GLUCOMTR-MCNC: 180 MG/DL — HIGH (ref 70–99)
GLUCOSE BLDC GLUCOMTR-MCNC: 213 MG/DL — HIGH (ref 70–99)
GLUCOSE SERPL-MCNC: 168 MG/DL — HIGH (ref 70–99)
HCT VFR BLD CALC: 33.4 % — LOW (ref 39–50)
HGB BLD-MCNC: 11.3 G/DL — LOW (ref 13–17)
LYMPHOCYTES # BLD AUTO: 21.9 % — SIGNIFICANT CHANGE UP (ref 13–44)
MAGNESIUM SERPL-MCNC: 1.9 MG/DL — SIGNIFICANT CHANGE UP (ref 1.6–2.6)
MCHC RBC-ENTMCNC: 25.6 PG — LOW (ref 27–34)
MCHC RBC-ENTMCNC: 33.8 G/DL — SIGNIFICANT CHANGE UP (ref 32–36)
MCV RBC AUTO: 75.7 FL — LOW (ref 80–100)
MONOCYTES NFR BLD AUTO: 8.4 % — SIGNIFICANT CHANGE UP (ref 2–14)
NEUTROPHILS NFR BLD AUTO: 66.2 % — SIGNIFICANT CHANGE UP (ref 43–77)
PHOSPHATE SERPL-MCNC: 3.1 MG/DL — SIGNIFICANT CHANGE UP (ref 2.5–4.5)
PLATELET # BLD AUTO: 223 K/UL — SIGNIFICANT CHANGE UP (ref 150–400)
POTASSIUM SERPL-MCNC: 3.6 MMOL/L — SIGNIFICANT CHANGE UP (ref 3.5–5.3)
POTASSIUM SERPL-SCNC: 3.6 MMOL/L — SIGNIFICANT CHANGE UP (ref 3.5–5.3)
RBC # BLD: 4.41 M/UL — SIGNIFICANT CHANGE UP (ref 4.2–5.8)
RBC # FLD: 12.9 % — SIGNIFICANT CHANGE UP (ref 10.3–16.9)
SODIUM SERPL-SCNC: 134 MMOL/L — LOW (ref 135–145)
SPECIMEN SOURCE: SIGNIFICANT CHANGE UP
SPECIMEN SOURCE: SIGNIFICANT CHANGE UP
WBC # BLD: 7.6 K/UL — SIGNIFICANT CHANGE UP (ref 3.8–10.5)
WBC # FLD AUTO: 7.6 K/UL — SIGNIFICANT CHANGE UP (ref 3.8–10.5)

## 2018-02-02 PROCEDURE — 87075 CULTR BACTERIA EXCEPT BLOOD: CPT

## 2018-02-02 PROCEDURE — 82962 GLUCOSE BLOOD TEST: CPT

## 2018-02-02 PROCEDURE — 99285 EMERGENCY DEPT VISIT HI MDM: CPT | Mod: 25

## 2018-02-02 PROCEDURE — 83036 HEMOGLOBIN GLYCOSYLATED A1C: CPT

## 2018-02-02 PROCEDURE — 80048 BASIC METABOLIC PNL TOTAL CA: CPT

## 2018-02-02 PROCEDURE — 76000 FLUOROSCOPY <1 HR PHYS/QHP: CPT

## 2018-02-02 PROCEDURE — 96374 THER/PROPH/DIAG INJ IV PUSH: CPT

## 2018-02-02 PROCEDURE — 36415 COLL VENOUS BLD VENIPUNCTURE: CPT

## 2018-02-02 PROCEDURE — 86850 RBC ANTIBODY SCREEN: CPT

## 2018-02-02 PROCEDURE — 85027 COMPLETE CBC AUTOMATED: CPT

## 2018-02-02 PROCEDURE — 85610 PROTHROMBIN TIME: CPT

## 2018-02-02 PROCEDURE — 83735 ASSAY OF MAGNESIUM: CPT

## 2018-02-02 PROCEDURE — C8929: CPT

## 2018-02-02 PROCEDURE — C1769: CPT

## 2018-02-02 PROCEDURE — 87086 URINE CULTURE/COLONY COUNT: CPT

## 2018-02-02 PROCEDURE — 80053 COMPREHEN METABOLIC PANEL: CPT

## 2018-02-02 PROCEDURE — 86901 BLOOD TYPING SEROLOGIC RH(D): CPT

## 2018-02-02 PROCEDURE — 86900 BLOOD TYPING SEROLOGIC ABO: CPT

## 2018-02-02 PROCEDURE — 84100 ASSAY OF PHOSPHORUS: CPT

## 2018-02-02 PROCEDURE — 76870 US EXAM SCROTUM: CPT

## 2018-02-02 PROCEDURE — 85730 THROMBOPLASTIN TIME PARTIAL: CPT

## 2018-02-02 PROCEDURE — 71045 X-RAY EXAM CHEST 1 VIEW: CPT

## 2018-02-02 PROCEDURE — 87070 CULTURE OTHR SPECIMN AEROBIC: CPT

## 2018-02-02 PROCEDURE — 87040 BLOOD CULTURE FOR BACTERIA: CPT

## 2018-02-02 PROCEDURE — 85025 COMPLETE CBC W/AUTO DIFF WBC: CPT

## 2018-02-02 PROCEDURE — 93005 ELECTROCARDIOGRAM TRACING: CPT

## 2018-02-02 RX ORDER — MAGNESIUM OXIDE 400 MG ORAL TABLET 241.3 MG
400 TABLET ORAL ONCE
Qty: 0 | Refills: 0 | Status: COMPLETED | OUTPATIENT
Start: 2018-02-02 | End: 2018-02-02

## 2018-02-02 RX ORDER — AZTREONAM 2 G
1 VIAL (EA) INJECTION
Qty: 28 | Refills: 0 | OUTPATIENT
Start: 2018-02-02 | End: 2018-02-15

## 2018-02-02 RX ORDER — POTASSIUM CHLORIDE 20 MEQ
40 PACKET (EA) ORAL ONCE
Qty: 0 | Refills: 0 | Status: COMPLETED | OUTPATIENT
Start: 2018-02-02 | End: 2018-02-02

## 2018-02-02 RX ADMIN — Medication 6 UNIT(S): at 08:06

## 2018-02-02 RX ADMIN — Medication 4: at 12:08

## 2018-02-02 RX ADMIN — ISOSORBIDE MONONITRATE 30 MILLIGRAM(S): 60 TABLET, EXTENDED RELEASE ORAL at 12:08

## 2018-02-02 RX ADMIN — Medication 166.67 MILLIGRAM(S): at 06:25

## 2018-02-02 RX ADMIN — Medication 25 MILLIGRAM(S): at 06:24

## 2018-02-02 RX ADMIN — Medication 6 UNIT(S): at 12:09

## 2018-02-02 RX ADMIN — Medication 2: at 08:06

## 2018-02-02 RX ADMIN — OXYCODONE HYDROCHLORIDE 5 MILLIGRAM(S): 5 TABLET ORAL at 03:18

## 2018-02-02 RX ADMIN — Medication 40 MILLIEQUIVALENT(S): at 12:08

## 2018-02-02 RX ADMIN — PIPERACILLIN AND TAZOBACTAM 200 GRAM(S): 4; .5 INJECTION, POWDER, LYOPHILIZED, FOR SOLUTION INTRAVENOUS at 06:25

## 2018-02-02 RX ADMIN — OXYCODONE HYDROCHLORIDE 5 MILLIGRAM(S): 5 TABLET ORAL at 04:18

## 2018-02-02 RX ADMIN — Medication 100 MILLIGRAM(S): at 06:25

## 2018-02-02 RX ADMIN — Medication 81 MILLIGRAM(S): at 12:08

## 2018-02-02 RX ADMIN — MAGNESIUM OXIDE 400 MG ORAL TABLET 400 MILLIGRAM(S): 241.3 TABLET ORAL at 12:08

## 2018-02-02 RX ADMIN — PIPERACILLIN AND TAZOBACTAM 200 GRAM(S): 4; .5 INJECTION, POWDER, LYOPHILIZED, FOR SOLUTION INTRAVENOUS at 01:18

## 2018-02-02 RX ADMIN — PIPERACILLIN AND TAZOBACTAM 200 GRAM(S): 4; .5 INJECTION, POWDER, LYOPHILIZED, FOR SOLUTION INTRAVENOUS at 12:11

## 2018-02-02 NOTE — DISCHARGE NOTE ADULT - HOSPITAL COURSE
57M hx dm, CAD s/p PCI, urethral stricture s/p ?urethroplasty, htn, hld, perineal abscess s/p I&D 2017 came to [facility] ED with recurrence perineal abscess. Pt started on vanc/zosyn. Dr Jordan (cardiologist) cleared pt for surgery. Pt underwent I&D perineum, RUG, urethral dilation 1/31. Tolerated procedure well. Endocrine consulted for elevated blood sugar; premeal insulin and lantus added to sliding scale. 2/1 Pt had temperature 101.3, 102 rectal. Bcx, Ucx and CXR performed. We were waiting for patient to be afebrile for 24h, however he is refusing to stay in the hospital. Currently AVSS, afebrile and hemodynamically stable

## 2018-02-02 NOTE — DISCHARGE NOTE ADULT - MEDICATION SUMMARY - MEDICATIONS TO TAKE
I will START or STAY ON the medications listed below when I get home from the hospital:    aspirin 81 mg oral delayed release tablet  -- 1 tab(s) by mouth once a day  -- Indication: For home med    lisinopril 5 mg oral tablet  -- 1 tab(s) by mouth once a day  -- Indication: For home med    isosorbide mononitrate 30 mg oral tablet, extended release  -- 1 tab(s) by mouth once a day  -- Indication: For home med    Lantus 100 units/mL subcutaneous solution  -- 33 unit(s) subcutaneous once a day  -- Indication: For home med    atorvastatin 40 mg oral tablet  -- 1 tab(s) by mouth once a day  -- Indication: For home med    Effient 10 mg oral tablet  -- 1 tab(s) by mouth once a day  -- Indication: For home med    hydroCHLOROthiazide 25 mg oral tablet  -- 1 tab(s) by mouth once a day  -- Indication: For home med    Bactrim  mg-160 mg oral tablet  -- 1 tab(s) by mouth 2 times a day   -- Avoid prolonged or excessive exposure to direct and/or artificial sunlight while taking this medication.  Finish all this medication unless otherwise directed by prescriber.  Medication should be taken with plenty of water.    -- Indication: For Perineal abscess

## 2018-02-02 NOTE — DISCHARGE NOTE ADULT - CARE PLAN
Principal Discharge DX:	Perineal abscess  Goal:	improvement after surgery  Assessment and plan of treatment:	diabetic diet, activity as tolerated, lewis to leg bag. if fever >100.4 or any change or worsening of symptoms please call doctor or report to ED. Make followup appointment next week with Dr Steward for lewis catheter removal. Principal Discharge DX:	Perineal abscess  Goal:	improvement after surgery  Assessment and plan of treatment:	diabetic diet, activity as tolerated, lewis to leg bag. Please keep perineum clean with gauze. If fever >100.4 or any change or worsening of symptoms please call doctor or report to ED. Make followup appointment next week with Dr Steward for lewis catheter removal.

## 2018-02-02 NOTE — DISCHARGE NOTE ADULT - PATIENT PORTAL LINK FT
“You can access the [facility] Patient Portal, offered by [facility], by registering with the following website: [email or web address]”

## 2018-02-02 NOTE — DISCHARGE NOTE ADULT - PLAN OF CARE
improvement after surgery diabetic diet, activity as tolerated, lewis to leg bag. if fever >100.4 or any change or worsening of symptoms please call doctor or report to ED. Make followup appointment next week with Dr Steward for lewis catheter removal. diabetic diet, activity as tolerated, lewis to leg bag. Please keep perineum clean with gauze. If fever >100.4 or any change or worsening of symptoms please call doctor or report to ED. Make followup appointment next week with Dr Steward for lewis catheter removal.

## 2018-02-02 NOTE — PROGRESS NOTE ADULT - SUBJECTIVE AND OBJECTIVE BOX
Chief Complaint/Reason for Consult: periop cv mgmt  INTERVAL HPI:57M PMH CAD s/p PCI, DM, HTN, HLD, urethral stricture s/p rx, perineal abscess s/p I&D (2017) p/w recurrence of perineal abscess c/o painful perineum with some d/c x 6d. He denies shaving area or known trauma, f/c, n/v/d/c, hematuria, dysuria or LUTS  	  MEDICATIONS:  hydrochlorothiazide 25 milliGRAM(s) Oral daily  isosorbide   mononitrate ER Tablet (IMDUR) 30 milliGRAM(s) Oral daily  lisinopril 5 milliGRAM(s) Oral daily    piperacillin/tazobactam IVPB. 3.375 Gram(s) IV Intermittent every 6 hours  vancomycin  IVPB 1000 milliGRAM(s) IV Intermittent every 12 hours      morphine  - Injectable 4 milliGRAM(s) IV Push every 6 hours PRN  ondansetron Injectable 4 milliGRAM(s) IV Push every 6 hours PRN    docusate sodium 100 milliGRAM(s) Oral two times a day  senna 2 Tablet(s) Oral at bedtime PRN    atorvastatin 40 milliGRAM(s) Oral daily  atorvastatin 80 milliGRAM(s) Oral at bedtime  dextrose 50% Injectable 12.5 Gram(s) IV Push once  dextrose 50% Injectable 25 Gram(s) IV Push once  dextrose 50% Injectable 25 Gram(s) IV Push once  dextrose Gel 1 Dose(s) Oral once PRN  glucagon  Injectable 1 milliGRAM(s) IntraMuscular once PRN  insulin lispro (HumaLOG) corrective regimen sliding scale   SubCutaneous three times a day before meals    dextrose 5%. 1000 milliLiter(s) IV Continuous <Continuous>  sodium chloride 0.9%. 1000 milliLiter(s) IV Continuous <Continuous>      REVIEW OF SYSTEMS:  [x] As per HPI  CONSTITUTIONAL: No fever, weight loss, or fatigue  RESPIRATORY: No cough, wheezing, chills or hemoptysis; No Shortness of Breath  CARDIOVASCULAR: No chest pain, palpitations, dizziness, or leg swelling  GASTROINTESTINAL: No abdominal or epigastric pain. No nausea, vomiting, or hematemesis; No diarrhea or constipation. No melena or hematochezia.  MUSCULOSKELETAL: No joint pain or swelling; No muscle, back, or extremity pain  [x] All others negative	  [ ] Unable to obtain    PHYSICAL EXAM:  T(C): 37.1 (01-31-18 @ 09:30), Max: 37.2 (01-30-18 @ 22:40)  HR: 83 (01-31-18 @ 09:30) (82 - 92)  BP: 93/54 (01-31-18 @ 09:30) (93/54 - 116/74)  RR: 16 (01-31-18 @ 09:30) (16 - 18)  SpO2: 96% (01-31-18 @ 09:30) (96% - 97%)  Wt(kg): --  I&O's Summary      Weight (kg): 81.2 (01-30 @ 22:40)    Appearance: Normal	  HEENT:   Normal oral mucosa  Cardiovascular: Normal S1 S2, No JVD, No murmurs, No edema  Respiratory: Lungs clear to auscultation	  Gastrointestinal:  Soft, Non-tender, + BS	  Extremities: Normal range of motion, No clubbing, cyanosis or edema  Vascular: Peripheral pulses palpable 2+ bilaterally    TELEMETRY: 	    ECG: NSR no st changes unchanged    	  RADIOLOGY:   CXR:  CT:  US:    CARDIAC TESTING:  Echocardiogram:< from: TTE Echo w/Cont Complete (10.13.16 @ 16:28) >   performed in limited views only.  Study Quality: Poor.After echocontrast   the   definition of wall motion improved . The walll motion is normal. LV EF   is 50%   The left atrial size is normal.The right ventricle is not well   visualized.Structurally normal aortic valve.No aortic regurgitation   noted.Structurally normal mitral valve.No mitral regurgitation   noted.Structurally normal tricuspid valve.There is mild tricuspid   regurgitation.There is no echocardiographic evidence for pulmonary   hypertension.The pulmonic valve is not well visualized.No aortic root   dilatation.There is no pericardial effusion.    < end of copied text >    Catheterization:  Stress Test:      LABS:	 	    CARDIAC MARKERS:                                  12.7   12.5  )-----------( 291      ( 31 Jan 2018 06:57 )             38.0     01-31    132<L>  |  94<L>  |  12  ----------------------------<  279<H>  4.0   |  27  |  0.82    Ca    8.8      31 Jan 2018 06:58  Phos  3.0     01-31  Mg     1.9     01-31    TPro  8.2  /  Alb  3.4  /  TBili  0.5  /  DBili  x   /  AST  12  /  ALT  10  /  AlkPhos  97  01-31    proBNP:   Lipid Profile:   HgA1c: Hemoglobin A1C, Whole Blood: 11.1 % (01-31 @ 06:57)    TSH:     ASSESSMENT/PLAN: 	    # periop CV eval - Cardiac optimized for surgery  RCRI 6.6% low/moderate MACE  Patten Score 0.05% low MACE  please resume home aspirin 81mg and statin  continue isosorbide  Was on Toprol XL 25mg last discharge, - if has not taken for >48hours would not restart preop however will need to be on post operatively    #CAD - Nov 2016 EKG changes on tele w/ c/o chest pressure, and 12 lead EKG revealed inferolateral STEMI.  Procedure was stopped early and patient underwent cardiac cath receiving a THERON to mLCX w/ remaining mRCA 99%, pLAD 60%, mLAD 50%, D1 90% small vessel, EF 65%.  Patient presented 11/17/2016 for staged PCI. Pt is now s/p successful FFR/THERON to pLAD, FFR/THERON to mLAD, PTCA to D1 lesion. Patent pLCx stent.     #HTN - normotensive on current regimen    #CV Prevention -   q3mo Fasting Lipid Profile, Goal LDL<100, statin as tolerated.  q6week TSH check  q3mo 25-OHD Vitamin D Level, Goal 50, supplement as tolerated
INTERVAL HPI/OVERNIGHT EVENTS:  No acute events overnight.    VITALS:    T(F): 99.6 (02-01-18 @ 01:32), Max: 100.9 (01-31-18 @ 14:31)  HR: 79 (02-01-18 @ 01:32) (68 - 88)  BP: 111/68 (02-01-18 @ 01:32) (93/54 - 122/68)  RR: 16 (02-01-18 @ 01:32) (9 - 20)  SpO2: 97% (02-01-18 @ 01:32) (95% - 99%)  Wt(kg): --    I&O's Detail    31 Jan 2018 07:01  -  01 Feb 2018 05:48  --------------------------------------------------------  IN:    sodium chloride 0.9%.: 640 mL  Total IN: 640 mL    OUT:    Indwelling Catheter - Urethral: 700 mL  Total OUT: 700 mL    Total NET: -60 mL          MEDICATIONS:    ANTIBIOTICS:  piperacillin/tazobactam IVPB. 3.375 Gram(s) IV Intermittent every 6 hours  vancomycin  IVPB 1250 milliGRAM(s) IV Intermittent every 12 hours      PAIN CONTROL:  HYDROmorphone  Injectable 0.5 milliGRAM(s) IV Push every 4 hours PRN  ibuprofen  Tablet 400 milliGRAM(s) Oral every 6 hours PRN  ondansetron Injectable 4 milliGRAM(s) IV Push every 8 hours PRN  oxyCODONE    IR 5 milliGRAM(s) Oral every 4 hours PRN       MEDS:      HEME/ONC  aspirin enteric coated 81 milliGRAM(s) Oral daily        PHYSICAL EXAM:  General: No acute distress.  Alert and Oriented  Abdominal Exam: soft, NT, ND   Exam: FC intact, urine clear, scrotal support and dressing clean and dry      LABS:                        12.7   12.5  )-----------( 291      ( 31 Jan 2018 06:57 )             38.0     01-31    132<L>  |  94<L>  |  12  ----------------------------<  279<H>  4.0   |  27  |  0.82    Ca    8.8      31 Jan 2018 06:58  Phos  3.0     01-31  Mg     1.9     01-31    TPro  8.2  /  Alb  3.4  /  TBili  0.5  /  DBili  x   /  AST  12  /  ALT  10  /  AlkPhos  97  01-31    PT/INR - ( 31 Jan 2018 00:17 )   PT: 14.6 sec;   INR: 1.31          PTT - ( 31 Jan 2018 00:17 )  PTT:30.3 sec      RADIOLOGY & ADDITIONAL TESTS:
 AM NOTE    Patient is a 57y old  Male who presents with a chief complaint of perineal abscess x 6d (31 Jan 2018 02:43)    Pt admitted o/n and preop started for OR today to I&D abscess         Vital Signs Last 24 Hrs  T(C): 36.9 (31 Jan 2018 01:00), Max: 37.2 (30 Jan 2018 22:40)  T(F): 98.5 (31 Jan 2018 01:00), Max: 98.9 (30 Jan 2018 22:40)  HR: 82 (31 Jan 2018 01:00) (82 - 92)  BP: 116/74 (31 Jan 2018 01:00) (113/74 - 116/74)  BP(mean): --  RR: 16 (31 Jan 2018 01:00) (16 - 18)  SpO2: 97% (31 Jan 2018 01:00) (96% - 97%)    I&O's Summary      Gen: NAD, afebrile    Abd: soft, NTND, no guarding    : +induration and mild fluctuation of perineum ttp, small amt of purulent drainage from right side of lesion, +BRP                           13.0   10.4  )-----------( 262      ( 31 Jan 2018 00:16 )             38.8     01-31    131<L>  |  93<L>  |  13  ----------------------------<  386<H>  4.0   |  27  |  0.84    Ca    8.9      31 Jan 2018 00:18    TPro  8.2  /  Alb  3.4  /  TBili  0.5  /  DBili  x   /  AST  12  /  ALT  10  /  AlkPhos  97  01-31    cultures    A/P  57M with perineal abscess  -NPO, IVF  -OR today for I&D and washout  -dvt ppx  -OOB/IS  -pain meds PRN  -bowel regimen  -hold AC  -OOB/IS  -ISS
Chief Complaint/Reason for Consult: periop cv mgmt  INTERVAL HPI: post op s complications no cp sob palp  	  MEDICATIONS:  isosorbide   mononitrate ER Tablet (IMDUR) 30 milliGRAM(s) Oral daily  metoprolol succinate ER 25 milliGRAM(s) Oral daily    piperacillin/tazobactam IVPB. 3.375 Gram(s) IV Intermittent every 6 hours  vancomycin  IVPB 1250 milliGRAM(s) IV Intermittent every 12 hours      HYDROmorphone  Injectable 0.5 milliGRAM(s) IV Push every 4 hours PRN  ibuprofen  Tablet 400 milliGRAM(s) Oral every 6 hours PRN  ondansetron Injectable 4 milliGRAM(s) IV Push every 8 hours PRN  oxyCODONE    IR 5 milliGRAM(s) Oral every 4 hours PRN    docusate sodium 100 milliGRAM(s) Oral two times a day  senna 2 Tablet(s) Oral at bedtime    atorvastatin 80 milliGRAM(s) Oral at bedtime  insulin lispro (HumaLOG) corrective regimen sliding scale   SubCutaneous Before meals and at bedtime  insulin lispro Injectable (HumaLOG) 6 Unit(s) SubCutaneous three times a day before meals    aspirin enteric coated 81 milliGRAM(s) Oral daily  dextrose 5%. 1000 milliLiter(s) IV Continuous <Continuous>  sodium chloride 0.9%. 1000 milliLiter(s) IV Continuous <Continuous>      REVIEW OF SYSTEMS:  [x] As per HPI  CONSTITUTIONAL: No fever, weight loss, or fatigue  RESPIRATORY: No cough, wheezing, chills or hemoptysis; No Shortness of Breath  CARDIOVASCULAR: No chest pain, palpitations, dizziness, or leg swelling  GASTROINTESTINAL: No abdominal or epigastric pain. No nausea, vomiting, or hematemesis; No diarrhea or constipation. No melena or hematochezia.  MUSCULOSKELETAL: No joint pain or swelling; No muscle, back, or extremity pain  [x] All others negative	  [ ] Unable to obtain    PHYSICAL EXAM:  T(C): 37.1 (02-01-18 @ 09:05), Max: 38.5 (02-01-18 @ 07:02)  HR: 81 (02-01-18 @ 09:05) (68 - 95)  BP: 104/65 (02-01-18 @ 09:05) (98/61 - 122/68)  RR: 17 (02-01-18 @ 09:05) (9 - 20)  SpO2: 96% (02-01-18 @ 09:05) (95% - 99%)  Wt(kg): --  I&O's Summary    31 Jan 2018 07:01  -  01 Feb 2018 07:00  --------------------------------------------------------  IN: 1650 mL / OUT: 875 mL / NET: 775 mL    01 Feb 2018 07:01  -  01 Feb 2018 11:48  --------------------------------------------------------  IN: 300 mL / OUT: 0 mL / NET: 300 mL      Height (cm): 175.3 (02-01 @ 08:36)    Appearance: Normal	  HEENT:   Normal oral mucosa  Cardiovascular: Normal S1 S2, No JVD, No murmurs, No edema  Respiratory: Lungs clear to auscultation	  Gastrointestinal:  Soft, Non-tender, + BS	  Extremities: Normal range of motion, No clubbing, cyanosis or edema  Vascular: Peripheral pulses palpable 2+ bilaterally    TELEMETRY: 	    ECG:   	  RADIOLOGY:   CXR:  CT:  US:    CARDIAC TESTING:  Echocardiogram:  Catheterization:  Stress Test:      LABS:	 	    CARDIAC MARKERS:                                  11.6   9.8   )-----------( 227      ( 01 Feb 2018 07:18 )             34.3     02-01    133<L>  |  97  |  13  ----------------------------<  250<H>  4.1   |  25  |  1.26    Ca    8.4      01 Feb 2018 07:18  Phos  2.6     02-01  Mg     1.7     02-01    TPro  8.2  /  Alb  3.4  /  TBili  0.5  /  DBili  x   /  AST  12  /  ALT  10  /  AlkPhos  97  01-31    proBNP:   Lipid Profile:   HgA1c:   TSH:     ASSESSMENT/PLAN: 	    # post op s complcations    #CAD - non ischemic ecg no cp sob palp    #HTN - resume imdur and bb, pain control    #CV Prevention -   q3mo Fasting Lipid Profile, Goal LDL<100, statin as tolerated.  q6week TSH check  q3mo 25-OHD Vitamin D Level, Goal 50, supplement as tolerated
INTERVAL HPI/OVERNIGHT EVENTS:      Patient is a 57y old  Male who presents with a chief complaint of perineal abscess x 6d (02-02-18)   was seen and examined today. Reports the following symptoms:    CONSTITUTIONAL:  Negative fever or chills, feels well, good appetite  EYES:  Negative  blurry vision or double vision  CARDIOVASCULAR:  Negative for chest pain or palpitations  RESPIRATORY:  Negative for cough, wheezing, or SOB   GASTROINTESTINAL:  Negative for nausea, vomiting, diarrhea, constipation, or abdominal pain  GENITOURINARY:  Negative frequency, urgency or dysuria  NEUROLOGIC:  No headache, confusion, dizziness, lightheadedness    MEDICATIONS  (STANDING):  aspirin enteric coated 81 milliGRAM(s) Oral daily  atorvastatin 80 milliGRAM(s) Oral at bedtime  dextrose 5%. 1000 milliLiter(s) (50 mL/Hr) IV Continuous <Continuous>  docusate sodium 100 milliGRAM(s) Oral two times a day  insulin glargine Injectable (LANTUS) 20 Unit(s) SubCutaneous at bedtime  insulin lispro (HumaLOG) corrective regimen sliding scale   SubCutaneous Before meals and at bedtime  insulin lispro Injectable (HumaLOG) 6 Unit(s) SubCutaneous three times a day before meals  isosorbide   mononitrate ER Tablet (IMDUR) 30 milliGRAM(s) Oral daily  metoprolol succinate ER 25 milliGRAM(s) Oral daily  piperacillin/tazobactam IVPB. 3.375 Gram(s) IV Intermittent every 6 hours  senna 2 Tablet(s) Oral at bedtime  sodium chloride 0.9%. 1000 milliLiter(s) (80 mL/Hr) IV Continuous <Continuous>    MEDICATIONS  (PRN):  HYDROmorphone  Injectable 0.5 milliGRAM(s) IV Push every 4 hours PRN Severe Pain (7 - 10)  ibuprofen  Tablet 400 milliGRAM(s) Oral every 6 hours PRN temperature >100.4F  ondansetron Injectable 4 milliGRAM(s) IV Push every 8 hours PRN Nausea and/or Vomiting  oxyCODONE    IR 5 milliGRAM(s) Oral every 4 hours PRN Moderate Pain (4 - 6)        LABS:                        11.3   7.6   )-----------( 223      ( 02 Feb 2018 07:01 )             33.4     02-02    134<L>  |  97  |  14  ----------------------------<  168<H>  3.6   |  26  |  1.11    Ca    8.3<L>      02 Feb 2018 07:01  Phos  3.1     02-02  Mg     1.9     02-02      Hemoglobin A1C, Whole Blood: 11.1 % (01-31-18 @ 06:57)            RADIOLOGY & ADDITIONAL TESTS:      Vital Signs Last 24 Hrs  T(C): 37.1 (02 Feb 2018 09:20), Max: 37.1 (02 Feb 2018 09:20)  T(F): 98.7 (02 Feb 2018 09:20), Max: 98.7 (02 Feb 2018 09:20)  HR: 84 (02 Feb 2018 09:20) (68 - 84)  BP: 123/69 (02 Feb 2018 09:20) (109/69 - 123/69)  BP(mean): --  RR: 17 (02 Feb 2018 09:20) (17 - 17)  SpO2: 96% (02 Feb 2018 09:20) (95% - 96%)    CAPILLARY BLOOD GLUCOSE      PHYSICAL EXAM:  Constitutional: wn/wd in NAD.   HEENT: NCAT, MMM, OP clear, EOMI, no proptosis or lid retraction  Neck: supple, no acanthosis, no thyromegaly or palpable thyroid nodules   Respiratory: Lungs CTAB.  Cardiovascular: regular rhythm, normal S1 and S2, no audible murmurs, no peripheral edema  GI: soft, + bowel sounds, NT/ND  Neurology: no tremors, DTR 2+  Skin: no visible rashes/lesions  Psychiatric: AAO x 3, normal affect/mood.        A/P: 57yMale admitted for perineal abscess with h/o CAD, DM2.  Consulted and being followed for Diabetes Type 2    Uncontrolled DM Type (HbA1c 11.1%) managed in hospital with diabetic diet and intensive insulin regimen.  INPATIENT AND OUTPATIENT  Please continue 20 units Lantus AM/PM, premeal Lispro 6 units TID, and  Lispro moderate/low dose sliding scale 4 times daily (before meals and bedtime).    Please continue consistent carbohydrate (Diabetic) diet, FS ac & hs. Target  - 180 mg/dl.      Case discussed with attending and primary team      Attending attestation:  I have seen and evaluated the patient at the bedside.   Endocrine Nurse Practitioner/Fellow/Resident’s note reviewed, including vital signs, PE and laboratory results.  Direct personal management and extensive interpretation of the data conducted.   I agree with the Nurse Practitioner/Fellow/Resident’s assessment and recommendations as above.
UROLOGY POST OP NOTE (PAGER # [phone])    PROCEDURE: I&D perineum, urethral dilatation, RUG    T(C): 36.9 (01-31-18 @ 21:48), Max: 38.3 (01-31-18 @ 14:31)  HR: 72 (01-31-18 @ 22:33) (70 - 88)  BP: 104/66 (01-31-18 @ 22:18) (93/54 - 116/75)  RR: 14 (01-31-18 @ 22:33) (9 - 20)  SpO2: 97% (01-31-18 @ 22:33) (95% - 99%)  Wt(kg): --  UO: adequate    SUBJECTIVE: c/o some discomfort incision site. No N/V. NO CP/SOB.    ON PE: alert and awake    Abdomen: soft, NT, ND    : FC intact, urine clear, dressings clean and dry, scrotal support intact.                           12.7   12.5  )-----------( 291      ( 31 Jan 2018 06:57 )             38.0     01-31    132<L>  |  94<L>  |  12  ----------------------------<  279<H>  4.0   |  27  |  0.82    Ca    8.8      31 Jan 2018 06:58  Phos  3.0     01-31  Mg     1.9     01-31    TPro  8.2  /  Alb  3.4  /  TBili  0.5  /  DBili  x   /  AST  12  /  ALT  10  /  AlkPhos  97  01-31

## 2018-02-02 NOTE — DISCHARGE NOTE ADULT - CARE PROVIDER_API CALL
Godwin Steward (MD), Urology  [address] [place] [address]  Phone: [phone]  Fax: [phone]

## 2018-02-02 NOTE — DISCHARGE NOTE ADULT - MEDICATION SUMMARY - MEDICATIONS TO STOP TAKING
I will STOP taking the medications listed below when I get home from the hospital:    cefadroxil 1000 mg oral tablet  -- 1 tab(s) by mouth every 12 hours

## 2018-02-03 LAB
CULTURE RESULTS: NO GROWTH — SIGNIFICANT CHANGE UP
SPECIMEN SOURCE: SIGNIFICANT CHANGE UP

## 2018-02-06 LAB
CULTURE RESULTS: SIGNIFICANT CHANGE UP
SPECIMEN SOURCE: SIGNIFICANT CHANGE UP

## 2018-02-12 DIAGNOSIS — Z88.6 ALLERGY STATUS TO ANALGESIC AGENT: ICD-10-CM

## 2018-02-12 DIAGNOSIS — L02.215 CUTANEOUS ABSCESS OF PERINEUM: ICD-10-CM

## 2018-02-12 DIAGNOSIS — Z79.4 LONG TERM (CURRENT) USE OF INSULIN: ICD-10-CM

## 2018-02-12 DIAGNOSIS — Z95.5 PRESENCE OF CORONARY ANGIOPLASTY IMPLANT AND GRAFT: ICD-10-CM

## 2018-02-12 DIAGNOSIS — N35.9 URETHRAL STRICTURE, UNSPECIFIED: ICD-10-CM

## 2018-02-12 DIAGNOSIS — Z79.82 LONG TERM (CURRENT) USE OF ASPIRIN: ICD-10-CM

## 2018-02-12 DIAGNOSIS — E78.5 HYPERLIPIDEMIA, UNSPECIFIED: ICD-10-CM

## 2018-02-12 DIAGNOSIS — E11.65 TYPE 2 DIABETES MELLITUS WITH HYPERGLYCEMIA: ICD-10-CM

## 2018-02-12 DIAGNOSIS — I25.2 OLD MYOCARDIAL INFARCTION: ICD-10-CM

## 2018-02-12 DIAGNOSIS — I25.10 ATHEROSCLEROTIC HEART DISEASE OF NATIVE CORONARY ARTERY WITHOUT ANGINA PECTORIS: ICD-10-CM

## 2018-02-12 DIAGNOSIS — I10 ESSENTIAL (PRIMARY) HYPERTENSION: ICD-10-CM

## 2019-12-20 NOTE — CONSULT NOTE ADULT - ATTENDING COMMENTS
Patient seen and examined with Endocrine Team.  Agree with  medical resident's note.  56 yo. male with DM2, CAD/stents  REC:  Diabetic diet  Lantus 20 units sc qHS  Lispro 6 units sc tid premeals  Lispro moderate dose sliding scale  Outpatient F/U in Endocrine Clinic
4

## 2021-02-19 NOTE — ED PROVIDER NOTE - CADM POA URETHRAL CATHETER
[facility]      Interventional Radiology  Discharge Instructions Following Fistulogram      ? You may resume normal activities as tolerated, but avoid any strenuous activity or heavy lifting (>10 lbs.) involving your access arm.    ? Elevate and rest your arm as much as possible for the first 24 hours after the procedure.  This will promote healing and reduce swelling.     ? If bleeding or oozing should occur, apply fingertip pressure to puncture site to control bleeding, but avoid excessive pressure as this may clot off the fistula.  Hold light pressure for 10 minutes, or until bleeding/oozing is controlled.  If excessive bleeding is noted or if you are having difficulty controlling the bleeding with direct pressure, call 911.     ? If you develop fever, chills, excessive pain/tenderness or drainage at the puncture site, or have questions call your Doctor or Dialysis Center.     ? If you received sedation for your procedure: An adult should stay with you for 24 hours, Do Not drive a motor vehicle, operate machinery, or drink alcoholic beverages for 24 hours.     ? You may resume your normal medications immediately    ? If you notice sudden loss of pulse or thrill (buzzing feeling) in your fistula, contact your Doctor or Dialysis unit immediately.         [facility] INTERVENTIONAL RADIOLOGY DEPARTMENT  Procedure Physician:  Dr. Parker and Dr. Keita                                                         Date of procedure: February 19, 2021  Telephone Numbers: [phone] Monday-Friday 8:00 am to 4:30 pm  [phone] After 4:30 pm Monday-Friday, Weekends & Holidays.   Ask for the Interventional Radiologist on call.  Someone is on call 24 hrs/day  [facility] toll free number: [phone] Monday-Friday 8:00 am to 4:30 pm  [facility] Emergency Dept: [phone]           No